# Patient Record
Sex: FEMALE | Race: WHITE | Employment: STUDENT | URBAN - METROPOLITAN AREA
[De-identification: names, ages, dates, MRNs, and addresses within clinical notes are randomized per-mention and may not be internally consistent; named-entity substitution may affect disease eponyms.]

---

## 2021-06-23 ENCOUNTER — HOSPITAL ENCOUNTER (OUTPATIENT)
Dept: PHYSICAL THERAPY | Age: 21
Discharge: HOME | End: 2021-06-23
Payer: COMMERCIAL

## 2021-06-23 PROCEDURE — 97161 PT EVAL LOW COMPLEX 20 MIN: CPT | Performed by: PHYSICAL THERAPIST

## 2021-06-23 PROCEDURE — 97110 THERAPEUTIC EXERCISES: CPT | Performed by: PHYSICAL THERAPIST

## 2021-06-23 NOTE — PROGRESS NOTES
[address]    OUTPATIENT PHYSICAL THERAPY    INITIAL EVALUATION      NAME: Antoine Landaverde AGE: 22 y.o. 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 Patient also experiences aching back pain. Increased pain with prolonged walking and standing. She also experiences increase in pain upon standing after rest.     Functional Deficits and Limitations:   [x]     Sitting:   []    Dressing:   []    Reaching:  [x]     Standing:   []     Bathing:   []    Lifting:  [x]     Walking:   [x]     Cooking:   []    Yardwork:  [x]     Sleeping:   [x]     Cleaning:   []     Driving:  []     Work Tasks:n/a  []     Recreation:   []    Other:    HISTORY:  Past Medical History: No past medical history on file. No past surgical history on file. Precautions: None  Current Medications:  Singular; Ritalin; Allegra; Lexapro  Prior Level of Function/Home Situation: Independent  Personal factors and/or comorbidities impacting plan of care: Chronic BLE and back pain  Social/Work History:  Not working (Previously working at [employer] but had to stop due to pain)  Previous Therapy:  No    SUBJECTIVE:   \"I've walked on my tip toes since I was a baby, but when I started working at [employer] and walking 10 miles a day during my shift, that made the pain severe. \"    Patients goals for therapy: to stop tip toe walking    OBJECTIVE DATA SUMMARY:   EXAMINATION/PRESENTATION/DECISION MAKING:   Pain:  Location: B ankles, calves, feet, back  Quality: aching, burning, sharp and tearing  Now: 4/10  Best: 0/10  Worst: 8/10  Factors that improve pain: rest  Factors that increase pain: standing after resting, prolonged walking    Posture:   Slouched posture    Strength:   . LEFT  RIGHT  Hip flexion  4+/5  4+/5  Hip abduction  4/5  4/5  Hip extension  4+/5   4+/5  Knee flexion  5/5  5/5  Knee extension  4+/5  4+/5   Ankle DF  4/5  4/5  Ankle PF  5/5  5/5    Range of Motion:   Limited ankle DF B -3 degrees   Excessive B ankle PF ROM    Joint Mobility:   Hypomobile and pain B talocrural joint  Tender at T3-4 and T12-L5    Palpation:   Tender to palpation at B calves, quadriceps, QL, gluteals, TFL/ITband, and thoracic and lumbar paraspinals  Tender to palpation at left periscapular region, right ASIS, and B PSIS  Tender at plantar surface of B feet and B Achilles    Neurologic Assessment:   Tone: Normal   Sensation: Intact to light touch   Reflexes: NT    Special Tests:   Discomfort with SLR B  (-) Slump test   (+) Compression and distraction tests SIJ  (+) long sit: Right LE longer than left in long sitting    Mobility:   Transitional Movements: WNL    Gait: Patient walks on tip toes     Balance:  WNL    Functional Measure:   LEFS: 49/80    Based on the above components, the patient evaluation is determined to be of the following complexity level: LOW     TREATMENT/INTERVENTION:  Modalities (Rationale): None this date    Therapeutic Exercises: to develop strength, endurance, range of motion, and flexibility  Initial HEP provided 6/23/2021: Seated hamstring and piriformis stretch; seated plantar fascia stretch; supine hamstring stretch; long sitting gastroc stretch; standing gastroc and soleus stretch    Exercises in BOLD performed this date:     Seated:   Hamstring stretch: 30 sec holds B  Piriformis stretch: 30 sec holds B  Plantar fascia stretch: 30 sec holds B    Supine:   Hamstring stretch with sheet: 30 sec holds B    Long sitting:   Gastroc stretch with sheet: 30 sec holds B    Standing:   Gastroc stretch: 30 sec holds B  Soleus stretch: 30 sec holds B    Manual Therapy: for joint mobilization/manipulations and soft tissue mobilization  None this date    Neuro Re-Education: to improve movement, balance, coordination, kinesthetic sense, posture, and proprioception for sitting or standing balance  None this date    Therapeutic Activities: to improve functional performance, power, or agility  None this date    Ambulation/Gait Training:  None this date    Activity tolerance and post treatment pain report:   Good; 3/10    Education:  [x]     Home exercise program provided. Education was provided to the patient on the following topics: Patient provided with initial HEP and educated on importance of regular performance of exercises in pain free ROM. Access Code [email or web address]. Patient verbalized understanding of the topics presented. ASSESSMENT:   Jesusita Cerna is a 22 y.o. female who presents with burning and sharp pain in bilateral calves, feet, and ankles. Patient also experiences aching back pain. Pain has been present since childhood, but recent increase in pain due to prolonged walking required for work. Patient presents with idiopathic toe walking and possible SIJ dysfunction. Increased pain with prolonged walking and standing. She also experiences increase in pain upon standing after rest. She responded well to stretches this date. Patient encouraged to perform at least 15-30 minutes of stretching per day. Patient provided with initial HEP and educated on importance of regular performance of exercises in pain free ROM. Physical therapy problems based on objective data include: pain affecting function, decrease ROM, decrease strength, impaired gait/ balance, decrease ADL/ functional abilitiies, decrease activity tolerance, decrease flexibility/ joint mobility and decrease transfer abilities .   Patient will benefit from skilled intervention to address these impairments. Rehabilitation potential is considered to be Good. Factors which may influence rehabilitation potential include chronicity of symptoms. PLAN OF CARE:   Recommendations and Planned Interventions Include:  therapeutic activities, therapeutic exercises, gait training, balance training, manual therapy, neuro re-education, posture/biomechanics, heat/ice, ultrasound, E-stim, home exercise program, TENS and dry needling    Frequency/Duration:  Patient will benefit from physical therapy visits 1-2 times per week over 8 weeks to optimize improvement in these areas. GOALS  Short term goals  Time frame: 4 weeks  1. Patient will be compliant and independent with the initial HEP as evidenced by being able to perform without cuing. 2. Patient will report a 25% improvement in symptoms. 3. Patient report a 25% improvement in sleeping. 4. Patient will have a 3 degree increase in B ankle DF ROM to allow ease with mobility. 5. Patient will have an increased tolerance for walking to allow 60 minutes of the activity before symptoms start. 6. Patient will tolerate 20 minutes of clinic activities before increase of symptoms. Long term goals  Time frame: 8 weeks  1. Patient will report pain level decrease to 0/10 to allow increased ease of movement. 2. Patient will have an improved score on the LEFS outcome measure by 9 points to demonstrate an increase in functional activity tolerance. 3. Patient will be independent in final individualized HEP. 4. Patient will have an increase in B ankle DF ROM to 3 degrees to allow performance of household tasks. 5. Patient will have an increase in bilateral hip abductor strength to 5/5 to allow performance of household tasks. 6. Patient will return to walking without being limited by symptoms. 7. Patient will sleep 6-8 hours without being interrupted by pain.          [x]     Patient has participated in goal setting and agrees to work toward plan of care. [x]     Patient was instructed to call if questions or concerns arise. Thank you for this referral.  Kyle Estevez, PT   Time Calculation: 55 mins    Patient Time in clinic:   Start Time: 1000   Stop Time: 1055    TREATMENT PLAN EFFECTIVE DATES:   6/23/2021 TO 8/27/2021  I have read the above plan of care for [facility] and certify the need for skilled physical therapy services.     Physician Signature: ____________________________________________________    Date: _________________________________________________________________

## 2021-07-14 ENCOUNTER — HOSPITAL ENCOUNTER (OUTPATIENT)
Dept: PHYSICAL THERAPY | Age: 21
Discharge: HOME | End: 2021-07-14
Payer: COMMERCIAL

## 2021-07-14 PROCEDURE — 97110 THERAPEUTIC EXERCISES: CPT | Performed by: PHYSICAL THERAPIST

## 2021-07-14 PROCEDURE — 97140 MANUAL THERAPY 1/> REGIONS: CPT | Performed by: PHYSICAL THERAPIST

## 2021-07-14 NOTE — PROGRESS NOTES
[facility]  [address], [address] [place]    OUTPATIENT PHYSICAL THERAPY DAILY TREATMENT NOTE  VISIT: 2    NAME: Rianna Landaverde AGE: 22 y.o. GENDER: female  DATE: 7/14/2021  REFERRING PHYSICIAN: Navya Locke DPM    GOALS  Short term goals  Time frame: 4 weeks  1. Patient will be compliant and independent with the initial HEP as evidenced by being able to perform without cuing. 2. Patient will report a 25% improvement in symptoms. 3. Patient report a 25% improvement in sleeping. 4. Patient will have a 3 degree increase in B ankle DF ROM to allow ease with mobility. 5. Patient will have an increased tolerance for walking to allow 60 minutes of the activity before symptoms start. 6. Patient will tolerate 20 minutes of clinic activities before increase of symptoms.      Long term goals  Time frame: 8 weeks  1. Patient will report pain level decrease to 0/10 to allow increased ease of movement. 2. Patient will have an improved score on the LEFS outcome measure by 9 points to demonstrate an increase in functional activity tolerance. 3. Patient will be independent in final individualized HEP. 4. Patient will have an increase in B ankle DF ROM to 3 degrees to allow performance of household tasks. 5. Patient will have an increase in bilateral hip abductor strength to 5/5 to allow performance of household tasks. 6. Patient will return to walking without being limited by symptoms. 7. Patient will sleep 6-8 hours without being interrupted by pain. SUBJECTIVE:   \"I have noticed improved motion in my legs, but I am still getting really fatigued throughout my legs with even short walks. I want to get back in the gym, but I am worried I will be sore and I won't be able to keep up with my exercises. \"    Pain In: 3/10    OBJECTIVE DATA SUMMARY:     EXAMINATION/PRESENTATION/DECISION MAKING:   Pain:  Location: B ankles, calves, feet, back  Quality: aching, burning, sharp and tearing  Now: 4/10  Best: 0/10  Worst: 8/10  Factors that improve pain: rest  Factors that increase pain: standing after resting, prolonged walking     Posture:   Slouched posture     Strength: [place] LEFT                  RIGHT  Hip flexion                       4+/5                   4+/5  Hip abduction                 4/5                     4/5  Hip extension                  4+/5                   4+/5  Knee flexion                    5/5                     5/5  Knee extension               4+/5                   4+/5        Ankle DF                         4/5                     4/5  Ankle PF                         5/5                     5/5     Range of Motion:   Limited ankle DF B -3 degrees   Excessive B ankle PF ROM     Joint Mobility:   Hypomobile and pain B talocrural joint  Tender at T3-4 and T12-L5     Palpation:   Tender to palpation at B calves, quadriceps, QL, gluteals, TFL/ITband, and thoracic and lumbar paraspinals  Tender to palpation at left periscapular region, right ASIS, and B PSIS  Tender at plantar surface of B feet and B Achilles     Neurologic Assessment:               Tone: Normal               Sensation: Intact to light touch               Reflexes: NT     Special Tests:   Discomfort with SLR B  (-) Slump test   (+) Compression and distraction tests SIJ  (+) long sit: Right LE longer than left in long sitting     Mobility:               Transitional Movements: WNL                Gait: Patient walks on tip toes      Balance:  WNL     Functional Measure:   LEFS: 49/80     Based on the above components, the patient evaluation is determined to be of the following complexity level: LOW      TREATMENT/INTERVENTION:  Modalities (Rationale): None this date     Therapeutic Exercises: to develop strength, endurance, range of motion, and flexibility  Initial HEP provided 6/23/2021: Seated hamstring and piriformis stretch; seated plantar fascia stretch; supine hamstring stretch; long sitting gastroc stretch; standing gastroc and soleus stretch  Added to HEP 7/14/21: Bridge with GTB; static lunges     Exercises in BOLD performed this date:      Seated:   Hamstring stretch: 30 sec holds B  Piriformis stretch: 30 sec holds B  Plantar fascia stretch: 30 sec holds B     Supine:   Hamstring stretch with sheet: 30 sec holds B  Bridge with GTB 3x 10      Long sitting:   Gastroc stretch with sheet: 30 sec holds B     Standing:   Gastroc stretch: 30 sec holds B  Soleus stretch: 30 sec holds B  Static lunges at table 2x 10     Manual Therapy: for joint mobilization/manipulations and soft tissue mobilization  TCJ posterior glide Gr III L ankle for improved DF mobility  Passive gastroc stretching in long sitting BL     Neuro Re-Education: to improve movement, balance, coordination, kinesthetic sense, posture, and proprioception for sitting or standing balance  None this date     Therapeutic Activities: to improve functional performance, power, or agility  None this date     Ambulation/Gait Training:  None this date     Activity tolerance and post treatment pain report:   Good, required significant verbal cues for new exercises to allow for proper form    Pain Out: 3/10    Education:  Education was provided to the patient on the following topics: Gradual return to exercise and management of DOMS; body mechanics with exercise routine and ambulation. []    No changes were made to the home exercise program.  [x]    The following changes were made to the home exercise program: see above  Patient verbalized understanding of the topics presented. ASSESSMENT:   Patient responded well to intervention today with improved mobility of L ankle and improved body mechanics with transfers, walking, and desired LE resistance training. Posterior chain and BL ankle mobility remain impaired are contributing to her symptom presentation.  Patient will continue to benefit from PT intervention to address listed impairments to allow for improved ambulatory tolerance and return to desired exercise routine. Patients progression toward goals is as follows:  []     Improving appropriately and progressing toward goals  [x]     Improving slowly and progressing toward goals  []     Not making progress toward goals and plan of care will be adjusted    PLAN OF CARE:   Patient continues to benefit from skilled intervention to address the above impairments. [x]    Continue treatment per established plan of care.   []     Recommend the following changes to the plan of care:     Recommendations/Intent for next treatment: Progress posterior chain mobility exercise and assist with return to desired resistance training      Magi Lepe, PT   Time Calculation: 30 mins  Patient Time in clinic:   Start Time: 0932   Stop Time: [phone]

## 2021-07-21 ENCOUNTER — HOSPITAL ENCOUNTER (OUTPATIENT)
Dept: PHYSICAL THERAPY | Age: 21
Discharge: HOME | End: 2021-07-21
Payer: COMMERCIAL

## 2021-07-21 NOTE — PROGRESS NOTES
[facility]  [address], [address] [facility]    OUTPATIENT PHYSICAL THERAPY      7/21/2021:  Ida Mora was not seen on this date for physical therapy for the following reasons:    [x]     Patient called to cancel the visit for the following reasons: scheduling conflict. []     Patient missed the visit and did not call to cancel.     Mehdi Mayes, PT

## 2021-09-24 NOTE — PROGRESS NOTES
[facility]  [address], [address] [facility]    OUTPATIENT physical Therapy discharge note      9/24/2021:  Patient will be discharged from physical therapy at this time. Criteria for termination of care:    []           Patient has plateaued  [x]           Patient has not returned to therapy  []           Patient has missed three or more visits without prior notification  []           Other    Patient was seen for 2 visits from 6/23/21 to 7/14/21. Please refer to the most recent progress note for the latest PT info available. If you need anything further faxed to you, please contact us at [phone].     Thank you for this referral.  Silviano Chester, PT